# Patient Record
Sex: MALE
[De-identification: names, ages, dates, MRNs, and addresses within clinical notes are randomized per-mention and may not be internally consistent; named-entity substitution may affect disease eponyms.]

---

## 2020-04-14 ENCOUNTER — NURSE TRIAGE (OUTPATIENT)
Dept: OTHER | Facility: CLINIC | Age: 25
End: 2020-04-14

## 2020-04-20 ENCOUNTER — NURSE TRIAGE (OUTPATIENT)
Dept: OTHER | Facility: CLINIC | Age: 25
End: 2020-04-20

## 2020-04-20 NOTE — TELEPHONE ENCOUNTER
Pt called in to report; Fever- 99 (today)  chills   body aches  Ongoing for 7 days    Reason for Disposition   Fever present > 3 days (72 hours)    Answer Assessment - Initial Assessment Questions  1. TEMPERATURE: \"What is the most recent temperature? \"  \"How was it measured? \"       99.0 (oral)  2. ONSET: \"When did the fever start? \"       7 days ago  3. SYMPTOMS: \"Do you have any other symptoms besides the fever? \"  (e.g., colds, headache, sore throat, earache, cough, rash, diarrhea, vomiting, abdominal pain)      Body aches, chills  4. CAUSE: If there are no symptoms, ask: \"What do you think is causing the fever? \"       unsure  5. CONTACTS: \"Does anyone else in the family have an infection? \"      No  6. TREATMENT: \"What have you done so far to treat this fever? \" (e.g., medications)      tylenol  7. IMMUNOCOMPROMISE: \"Do you have of the following: diabetes, HIV positive, splenectomy, cancer chemotherapy, chronic steroid treatment, transplant patient, etc.\"      No  8. PREGNANCY: \"Is there any chance you are pregnant? \" \"When was your last menstrual period? \"      No  9. TRAVEL: \"Have you traveled out of the country in the last month? \" (e.g., travel history, exposures)      No    Protocols used:  FEVER-ADULT-[place]